# Patient Record
Sex: MALE | Race: WHITE
[De-identification: names, ages, dates, MRNs, and addresses within clinical notes are randomized per-mention and may not be internally consistent; named-entity substitution may affect disease eponyms.]

---

## 2019-04-21 ENCOUNTER — HOSPITAL ENCOUNTER (EMERGENCY)
Dept: HOSPITAL 1 - ED | Age: 59
Discharge: HOME | End: 2019-04-21
Payer: MEDICAID

## 2019-04-21 VITALS — SYSTOLIC BLOOD PRESSURE: 150 MMHG | DIASTOLIC BLOOD PRESSURE: 69 MMHG

## 2019-04-21 VITALS — WEIGHT: 172 LBS | HEIGHT: 72 IN | BODY MASS INDEX: 23.3 KG/M2
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
## 2019-04-21 DIAGNOSIS — Z88.6: ICD-10-CM

## 2019-04-21 DIAGNOSIS — M70.21: Primary | ICD-10-CM

## 2020-11-06 ENCOUNTER — HOSPITAL ENCOUNTER (EMERGENCY)
Dept: HOSPITAL 26 - MED | Age: 60
Discharge: HOME | End: 2020-11-06
Payer: COMMERCIAL

## 2020-11-06 VITALS — SYSTOLIC BLOOD PRESSURE: 158 MMHG | DIASTOLIC BLOOD PRESSURE: 93 MMHG

## 2020-11-06 VITALS — BODY MASS INDEX: 18.28 KG/M2 | WEIGHT: 135 LBS | HEIGHT: 72 IN

## 2020-11-06 VITALS — DIASTOLIC BLOOD PRESSURE: 93 MMHG | SYSTOLIC BLOOD PRESSURE: 158 MMHG

## 2020-11-06 DIAGNOSIS — Z88.6: ICD-10-CM

## 2020-11-06 DIAGNOSIS — K40.90: Primary | ICD-10-CM

## 2020-11-06 NOTE — NUR
Patient discharged with v/s stable. Written and verbal after care instructions 
given and explained. 

Patient verbalized understanding. Ambulatory with steady gait. ID BAND REMOVED. 
All questions addressed prior to discharge. Advised to follow up with PMD.

## 2020-11-06 NOTE — NUR
60 YEAR OLD MALE, COMPLAINING OF GROIN PAIN FOR 6 MONTHS. PATIENT STATES 7/10 
PAIN THAT RADIATES UP TOWARD THE ABDOMEN AND DOWN THE LEG. 



PMH: NONE



ALLERGIES: TYLENOL

## 2023-07-11 ENCOUNTER — HOSPITAL ENCOUNTER (EMERGENCY)
Dept: HOSPITAL 26 - MED | Age: 63
Discharge: HOME | End: 2023-07-11
Payer: COMMERCIAL

## 2023-07-11 VITALS — WEIGHT: 165 LBS | BODY MASS INDEX: 21.87 KG/M2 | HEIGHT: 73 IN

## 2023-07-11 VITALS
RESPIRATION RATE: 16 BRPM | HEART RATE: 99 BPM | TEMPERATURE: 97.7 F | OXYGEN SATURATION: 97 % | DIASTOLIC BLOOD PRESSURE: 95 MMHG | SYSTOLIC BLOOD PRESSURE: 145 MMHG
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
## 2023-07-11 DIAGNOSIS — S61.210A: Primary | ICD-10-CM
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
